# Patient Record
Sex: FEMALE
[De-identification: names, ages, dates, MRNs, and addresses within clinical notes are randomized per-mention and may not be internally consistent; named-entity substitution may affect disease eponyms.]

---

## 2017-08-07 ENCOUNTER — HOSPITAL ENCOUNTER (OUTPATIENT)
Dept: HOSPITAL 39 - MAMMO | Age: 57
End: 2017-08-07
Attending: OBSTETRICS & GYNECOLOGY
Payer: COMMERCIAL

## 2017-08-07 DIAGNOSIS — Z12.31: Primary | ICD-10-CM

## 2017-08-13 NOTE — MAM
EXAM DESCRIPTION: 3D Screening BILATERAL



CLINICAL HISTORY: 57 years, Female, Screening mammogram



COMPARISON: August 1, 2016



TECHNIQUE: CC and MLO digital mammograms with 3-D tomosynthesis. 

No CAD utilized.



FINDINGS: 

There are scattered fibroglandular densities.

There is no dominant mass nor any suspicious microcalcifications.

Benign microcalcifications are present.





IMPRESSION: 

BIRAD CATEGORY: 2 BENIGN



FOLLOW-UP:  Routine mammography screening.





Electronically signed by:  Tomás Hennessy MD  8/13/2017 1:32 PM

CDT Workstation: [id number]

## 2019-10-08 ENCOUNTER — HOSPITAL ENCOUNTER (OUTPATIENT)
Dept: HOSPITAL 39 - MAMMO | Age: 59
End: 2019-10-08
Attending: OBSTETRICS & GYNECOLOGY
Payer: COMMERCIAL

## 2019-10-08 DIAGNOSIS — Z12.31: Primary | ICD-10-CM

## 2019-10-10 NOTE — MAM
EXAM DESCRIPTION: 

3D Screening BILATERAL : Digital Mammography.



CLINICAL HISTORY: 

59 years Female SCREENING . No complaints. No personal history of

breast cancer. Remote family history of breast cancer. Menarche

age 15. Childbirth. Postmenopausal. No HRT. Benign right breast

biopsy.  Lifetime risk of developing breast cancer (Tyrer-Cuzick

model)(%):  8.1.



COMPARISON: 

Bilateral screening digital breast tomosynthesis 8/8/2018 and

8/7/2017.   



TECHNIQUE: 

Bilateral CC and MLO projection full-field images, digital

tomosynthesis mammographic technique. Bilateral digital 2-D

full-field MLO images.  CAD not available for tomosynthesis or

2-D images.  



FINDINGS: 

The breast parenchymal density pattern is: Scattered areas of

fibroglandular density. No skin thickening or nipple retraction. 

Bilateral solitary microcalcifications, more on the left. Right

axillary lymph nodes..

No new focal, stellate mass or density, focal asymmetry , and no

suspicious microcalcifications bilaterally. Stable mammograms

compared to prior study. 



IMPRESSION: 

Benign exam.



BIRAD CATEGORY: 2 BENIGN FINDINGS.



RECOMMENDATIONS:

FOLLOW UP: Routine digital bilateral  mammographic screening, one

year interval from  October 2019.



Written communication explaining the IMPRESSION and follow-up,

will be mailed to the patient and referring health care provider.



According to the [employer], yearly mammograms

are recommended starting at age 40 and continuing as long as a

woman is in good health.  Any breast change noted on a breast

self-exam should be reported promptly to the patient's healthcare

provider.  Breast MRI is recommended for women with an

approximately 20-25% or greater lifetime risk of breast cancer,

including women with a strong family history of breast or ovarian

cancer and women who have been treated for Hodgkin's disease.



A negative mammographic report should not delay tissue diagnosis

in patients with significant clinical history or physical

findings.



Extremely dense breast tissue limits the sensitivity of digital

mammography. 



Electronically signed by:  Duong Sinha MD  10/10/2019 9:04 PM

CDT Workstation: [id number]